# Patient Record
Sex: FEMALE | Race: WHITE | URBAN - METROPOLITAN AREA
[De-identification: names, ages, dates, MRNs, and addresses within clinical notes are randomized per-mention and may not be internally consistent; named-entity substitution may affect disease eponyms.]

---

## 2017-01-02 ENCOUNTER — TELEPHONE (OUTPATIENT)
Dept: FAMILY MEDICINE | Facility: CLINIC | Age: 27
End: 2017-01-02

## 2017-01-02 NOTE — TELEPHONE ENCOUNTER
Reason for Call:  Other     Detailed comments: dizzy, has been fighting a cold has questions    [phone]  Phone number    Best Time: any    Can we leave a detailed message on this number? YES    Call taken on 1/2/2017 at 1:46 PM by MAXIM [address]

## 2017-01-02 NOTE — TELEPHONE ENCOUNTER
Patient called reporting she has had a cold for 5-6 days. Feeling low energy has nasal congestion and feels dizzy. Other symptoms include nausea but denies vomiting. Had diarrhea over the weekend but resolved. The last couple days she has experienced dizziness. Taking OTC for cold Nyquil and sudafed. Advised patient try drinking power drinks instead of plain water, avoid sudden position changes. May try Flonase for nasal congestion use as directed to prevent rebond effect. Warning signs reviewed and advised she seek emergent care if worsening symptoms. Follow up with provider if no reilef with Hc treatment. UC information also given.

## 2017-01-03 ENCOUNTER — OFFICE VISIT (OUTPATIENT)
Dept: URGENT CARE | Facility: URGENT CARE | Age: 27
End: 2017-01-03
Payer: COMMERCIAL

## 2017-01-03 VITALS
SYSTOLIC BLOOD PRESSURE: 128 MMHG | HEIGHT: 70 IN | BODY MASS INDEX: 24.34 KG/M2 | WEIGHT: 170 LBS | DIASTOLIC BLOOD PRESSURE: 79 MMHG | HEART RATE: 71 BPM | TEMPERATURE: 98.3 F | OXYGEN SATURATION: 99 %

## 2017-01-03 DIAGNOSIS — H83.09 LABYRINTHITIS, UNSPECIFIED LATERALITY: Primary | ICD-10-CM

## 2017-01-03 DIAGNOSIS — J01.90 ACUTE SINUSITIS WITH SYMPTOMS GREATER THAN 10 DAYS: ICD-10-CM

## 2017-01-03 PROCEDURE — 99214 OFFICE O/P EST MOD 30 MIN: CPT | Performed by: EMERGENCY MEDICINE

## 2017-01-03 RX ORDER — METHYLPREDNISOLONE 4 MG
TABLET, DOSE PACK ORAL
Qty: 21 TABLET | Refills: 0 | Status: SHIPPED | OUTPATIENT
Start: 2017-01-03 | End: 2017-04-28

## 2017-01-03 RX ORDER — METHYLPREDNISOLONE 4 MG
TABLET, DOSE PACK ORAL
Qty: 21 TABLET | Refills: 0 | Status: SHIPPED | OUTPATIENT
Start: 2017-01-03 | End: 2017-01-03

## 2017-01-03 RX ORDER — AZITHROMYCIN 250 MG/1
250 TABLET, FILM COATED ORAL DAILY
Qty: 6 TABLET | Refills: 0 | Status: SHIPPED | OUTPATIENT
Start: 2017-01-03 | End: 2017-04-28

## 2017-01-03 NOTE — MR AVS SNAPSHOT
After Visit Summary   1/3/2017    Odalis Garcia    MRN: [id number]           Patient Information     Date Of Birth          1990        Visit Information        Provider Department      1/3/2017 8:00 PM Marc Bolden MD [facility]        Today's Diagnoses     Labyrinthitis, unspecified laterality    -  1     Acute sinusitis with symptoms greater than 10 days           Care Instructions      Inner Ear Problems: Causes of Dizziness (Vertigo)  Benign positional vertigo (BPV)    This is the most common cause of vertigo. BPV (also called benign positional paroxysmal vertigo or BPPV) results when crystals in the canals shift into the wrong position. Episodes usually occur when the head is moved in a certain way. This can happen when turning in bed, bending, or looking up.  BPV:    Causes episodes of vertigo that last for seconds. These episodes can occur several times a day, depending on body position.    Doesn t cause hearing loss.    Often goes away on its own, but may go away sooner with treatment.  Infection or inflammation    Sometimes the semicircular canals swell and send incorrect balance signals. This problem may be caused by a viral infection. Depending on the cause, hearing can be affected (labyrinthitis) or can remain normal (neuronitis).   Infection or inflammation:    Causes episodes of vertigo that last for hours or days. The first episode is usually the worst.    Can cause hearing loss.    Often goes away on its own, but may go away sooner with treatment.    May require vestibular rehabilitation if you have persistent imbalance.  Meniere s disease    Although uncommon, this condition happens when there is too much fluid in the canals. This causes increased pressure and swelling, and affects balance and hearing signals.  Meniere s disease may:    Cause episodes of vertigo that last for hours.    Cause fluctuating hearing problems, usually in one ear, that  "worsen over time.    Cause buzzing or ringing in the ears (tinnitus).    Cause a feeling of fullness or pressure in the ear.    Cause any of these symptoms: vertigo, hearing loss, tinnitus, or ear fullness to last a lifetime.    5767-6195 [employer]. [address] [place] [address]. All rights reserved. This information is not intended as a substitute for professional medical care. Always follow your healthcare professional's instructions.              Follow-ups after your visit        Who to contact     If you have questions or need follow up information about today's clinic visit or your schedule please contact [facility] directly at [phone].  Normal or non-critical lab and imaging results will be communicated to you by GoCoophart, letter or phone within 4 business days after the clinic has received the results. If you do not hear from us within 7 days, please contact the clinic through GoCoophart or phone. If you have a critical or abnormal lab result, we will notify you by phone as soon as possible.  Submit refill requests through Pose or call your pharmacy and they will forward the refill request to us. Please allow 3 business days for your refill to be completed.          Additional Information About Your Visit        GoCoopharKudarom Information     Pose lets you send messages to your doctor, view your test results, renew your prescriptions, schedule appointments and more. To sign up, go to [email or web address] . Click on \"Log in\" on the left side of the screen, which will take you to the Welcome page. Then click on \"Sign up Now\" on the right side of the page.     You will be asked to enter the access code listed below, as well as some personal information. Please follow the directions to create your username and password.     Your access code is: [email or web address]  Expires: 4/3/2017  9:22 PM     Your access code will  in 90 days. If you need help or a new " "code, please call your [facility] or [phone].        Care EveryWhere ID     This is your Care EveryWhere ID. This could be used by other organizations to access your [place] medical records  [id number]        Your Vitals Were     Pulse Temperature Height    71 98.3  F (36.8  C) (Tympanic) 5' 10\" (1.778 m)    BMI (Body Mass Index) Pulse Oximetry Last Period    24.39 kg/m2 99% 12/18/2016    Breastfeeding?          No         Blood Pressure from Last 3 Encounters:   01/03/17 128/79   07/29/16 120/76   02/29/16 110/62    Weight from Last 3 Encounters:   01/03/17 170 lb (77.111 kg)   07/29/16 168 lb (76.204 kg)   02/29/16 168 lb (76.204 kg)              Today, you had the following     No orders found for display         Today's Medication Changes          These changes are accurate as of: 1/3/17  9:22 PM.  If you have any questions, ask your nurse or doctor.               Start taking these medicines.        Dose/Directions    azithromycin 250 MG tablet   Commonly known as:  ZITHROMAX   Used for:  Acute sinusitis with symptoms greater than 10 days   Started by:  Marc Bolden MD        Dose:  250 mg   Take 1 tablet (250 mg) by mouth daily   Quantity:  6 tablet   Refills:  0       methylPREDNISolone 4 MG tablet   Commonly known as:  MEDROL DOSEPAK   Used for:  Labyrinthitis, unspecified laterality   Started by:  Marc Bolden MD        Follow package instructions   Quantity:  21 tablet   Refills:  0            Where to get your medicines      These medications were sent to [id number] - [place], [place] - [address] AT [facility] [address]  [facility] [address], [place] [address]    Hours:  24-hours Phone:  [phone]    - methylPREDNISolone 4 MG tablet      Some of these will need a paper prescription and others can be bought over the counter.  Ask your nurse if you have questions.     Bring a paper prescription for each of these medications    - azithromycin 250 MG " tablet             Primary Care Provider Office Phone # Fax #    Brigid Clare Guajardo [phone]       [address] [address]        Thank you!     Thank you for choosing [facility]  for your care. Our goal is always to provide you with excellent care. Hearing back from our patients is one way we can continue to improve our services. Please take a few minutes to complete the written survey that you may receive in the mail after your visit with us. Thank you!             Your Updated Medication List - Protect others around you: Learn how to safely use, store and throw away your medicines at www.disposemymeds.org.          This list is accurate as of: 1/3/17  9:22 PM.  Always use your most recent med list.                   Brand Name Dispense Instructions for use    azithromycin 250 MG tablet    ZITHROMAX    6 tablet    Take 1 tablet (250 mg) by mouth daily       fluticasone 50 MCG/ACT spray    FLONASE    1 Bottle    Spray 1-2 sprays into both nostrils daily       methylPREDNISolone 4 MG tablet    MEDROL DOSEPAK    21 tablet    Follow package instructions       * RITALIN 20 MG tablet   Generic drug:  methylphenidate      1 TABLET TWICE DAILY BEFORE MEALS       * methylphenidate 20 MG tablet    RITALIN    90 tablet    Take 1 tablet (20 mg) by mouth 3 times daily as needed       * methylphenidate 20 MG tablet   Start taking on:  1/25/2017    RITALIN    90 tablet    Take 1 tablet (20 mg) by mouth 3 times daily as needed       * methylphenidate 20 MG tablet   Start taking on:  2/22/2017    RITALIN    90 tablet    Take 1 tablet (20 mg) by mouth 3 times daily as needed       * Notice:  This list has 4 medication(s) that are the same as other medications prescribed for you. Read the directions carefully, and ask your doctor or other care provider to review them with you.

## 2017-01-04 ASSESSMENT — ENCOUNTER SYMPTOMS
WEAKNESS: 0
FATIGUE: 1
ABDOMINAL PAIN: 0
ANOREXIA: 0
SORE THROAT: 0
VISUAL CHANGE: 0
CHANGE IN BOWEL HABIT: 0
DIZZINESS: 1
NAUSEA: 0
DIAPHORESIS: 0
COUGH: 0
VOMITING: 0
FEVER: 0
VERTIGO: 0
HEADACHES: 0
NUMBNESS: 0
CHILLS: 0

## 2017-01-04 NOTE — PROGRESS NOTES
Dizziness  This is a new problem. The current episode started yesterday. The problem occurs 2 to 4 times per day. The problem has been gradually improving. Associated symptoms include congestion and fatigue. Pertinent negatives include no abdominal pain, anorexia, change in bowel habit, chest pain, chills, coughing, diaphoresis, fever, headaches, nausea, numbness, sore throat, vertigo, visual change, vomiting or weakness. The symptoms are aggravated by walking. She has tried NSAIDs, immobilization and rest (dramamine and sudafed) for the symptoms. The treatment provided mild relief.       No past medical history on file.    No past surgical history on file.    Social History     Social History     Marital Status: Single     Spouse Name: N/A     Number of Children: N/A     Years of Education: N/A     Occupational History     Not on file.     Social History Main Topics     Smoking status: Never Smoker      Smokeless tobacco: Never Used     Alcohol Use: 0.0 oz/week     0 Standard drinks or equivalent per week     Drug Use: No     Sexual Activity:     Partners: Male     Birth Control/ Protection: IUD     Other Topics Concern     Parent/Sibling W/ Cabg, Mi Or Angioplasty Before 65f 55m? No     Social History Narrative       Family History   Problem Relation Age of Onset     Cirrhosis Mother      Depression Mother      Migraines Mother      MENTAL ILLNESS Mother      Hyperlipidemia Father      Hypertension Father      Prostate Cancer Father      Hyperlipidemia Father      OSTEOPOROSIS Maternal Grandmother          Review of Systems   Constitutional: Positive for fatigue. Negative for fever, chills and diaphoresis.   HENT: Positive for congestion. Negative for sore throat.    Respiratory: Negative for cough.    Cardiovascular: Negative for chest pain.   Gastrointestinal: Negative for nausea, vomiting, abdominal pain, anorexia and change in bowel habit.   Neurological: Positive for dizziness. Negative for vertigo, weakness,  "numbness and headaches.         Physical Exam   Constitutional: She is oriented to person, place, and time and well-developed, well-nourished, and in no distress. No distress.   HENT:   Head: Normocephalic and atraumatic.   Right Ear: Tympanic membrane, external ear and ear canal normal.   Left Ear: Tympanic membrane, external ear and ear canal normal.   Mouth/Throat: Uvula is midline and oropharynx is clear and moist. No trismus in the jaw. No uvula swelling. No oropharyngeal exudate, posterior oropharyngeal edema, posterior oropharyngeal erythema or tonsillar abscesses.   Eyes: Conjunctivae and EOM are normal. Pupils are equal, round, and reactive to light.   Neck: Normal range of motion.   Pulmonary/Chest: Effort normal.   Musculoskeletal: Normal range of motion.   Neurological: She is alert and oriented to person, place, and time. She is not agitated and not disoriented. She displays no weakness, no tremor, facial symmetry, normal stance and normal speech. No cranial nerve deficit. She exhibits normal muscle tone. Coordination and gait normal.   Skin: Skin is warm and dry. She is not diaphoretic.   Psychiatric: Affect and judgment normal.   Nursing note and vitals reviewed.    /79 mmHg  Pulse 71  Temp(Src) 98.3  F (36.8  C) (Tympanic)  Ht 5' 10\" (1.778 m)  Wt 170 lb (77.111 kg)  BMI 24.39 kg/m2  SpO2 99%  LMP 12/18/2016  Breastfeeding? No      A/P:  Odalis was seen today for urgent care and dizziness.    Diagnoses and all orders for this visit:    Labyrinthitis, unspecified laterality  -     Discontinue: methylPREDNISolone (MEDROL DOSEPAK) 4 MG tablet; Follow package instructions  -     methylPREDNISolone (MEDROL DOSEPAK) 4 MG tablet; Follow package instructions    Acute sinusitis with symptoms greater than 10 days  -     azithromycin (ZITHROMAX) 250 MG tablet; Take 1 tablet (250 mg) by mouth daily      Questionable cause to her symptoms.  Seems reasonable to conclude that they are related to her " recent head cold.  She has tried sudafed and dramamine and reports that her symptoms are better today.  It is possible that this is related to labyrinthitis or even a BPPV, but I did not see any horizontal nystagmus after performing quick side to side head movements.  Given the option to try medrol to see if that may help with her symptoms.  Given a course of abx to use in case she starts to get more of a sensation similar to her past sinus infections.  If symptoms fail to get better or are getting worse recommend f/u with primary care of the UC/ER.  She agreed to plans.    Marc Bolden MD

## 2017-01-04 NOTE — PATIENT INSTRUCTIONS
Inner Ear Problems: Causes of Dizziness (Vertigo)  Benign positional vertigo (BPV)    This is the most common cause of vertigo. BPV (also called benign positional paroxysmal vertigo or BPPV) results when crystals in the canals shift into the wrong position. Episodes usually occur when the head is moved in a certain way. This can happen when turning in bed, bending, or looking up.  BPV:    Causes episodes of vertigo that last for seconds. These episodes can occur several times a day, depending on body position.    Doesn t cause hearing loss.    Often goes away on its own, but may go away sooner with treatment.  Infection or inflammation    Sometimes the semicircular canals swell and send incorrect balance signals. This problem may be caused by a viral infection. Depending on the cause, hearing can be affected (labyrinthitis) or can remain normal (neuronitis).   Infection or inflammation:    Causes episodes of vertigo that last for hours or days. The first episode is usually the worst.    Can cause hearing loss.    Often goes away on its own, but may go away sooner with treatment.    May require vestibular rehabilitation if you have persistent imbalance.  Meniere s disease    Although uncommon, this condition happens when there is too much fluid in the canals. This causes increased pressure and swelling, and affects balance and hearing signals.  Meniere s disease may:    Cause episodes of vertigo that last for hours.    Cause fluctuating hearing problems, usually in one ear, that worsen over time.    Cause buzzing or ringing in the ears (tinnitus).    Cause a feeling of fullness or pressure in the ear.    Cause any of these symptoms: vertigo, hearing loss, tinnitus, or ear fullness to last a lifetime.    6692-9460 [employer]. [address] [place] [address]. All rights reserved. This information is not intended as a substitute for professional medical care. Always follow your healthcare  professional's instructions.

## 2017-01-04 NOTE — NURSING NOTE
"Chief Complaint   Patient presents with     Urgent Care     Dizziness     c/o dizziness for 2 days       Initial /79 mmHg  Pulse 71  Temp(Src) 98.3  F (36.8  C) (Tympanic)  Ht 5' 10\" (1.778 m)  Wt 170 lb (77.111 kg)  BMI 24.39 kg/m2  SpO2 99%  LMP 12/18/2016  Breastfeeding? No Estimated body mass index is 24.39 kg/(m^2) as calculated from the following:    Height as of this encounter: 5' 10\" (1.778 m).    Weight as of this encounter: 170 lb (77.111 kg).  BP completed using cuff size: regular  Rosita Vicente MA    "

## 2017-04-28 ENCOUNTER — TELEPHONE (OUTPATIENT)
Dept: NURSING | Facility: CLINIC | Age: 27
End: 2017-04-28

## 2017-04-28 ENCOUNTER — OFFICE VISIT (OUTPATIENT)
Dept: FAMILY MEDICINE | Facility: CLINIC | Age: 27
End: 2017-04-28
Payer: COMMERCIAL

## 2017-04-28 VITALS
DIASTOLIC BLOOD PRESSURE: 68 MMHG | SYSTOLIC BLOOD PRESSURE: 112 MMHG | WEIGHT: 170 LBS | BODY MASS INDEX: 24.34 KG/M2 | HEIGHT: 70 IN | TEMPERATURE: 97.9 F | RESPIRATION RATE: 16 BRPM | OXYGEN SATURATION: 99 % | HEART RATE: 78 BPM

## 2017-04-28 DIAGNOSIS — R82.90 ABNORMAL URINE FINDINGS: ICD-10-CM

## 2017-04-28 DIAGNOSIS — R30.0 DYSURIA: Primary | ICD-10-CM

## 2017-04-28 LAB
ALBUMIN UR-MCNC: 30 MG/DL
APPEARANCE UR: ABNORMAL
BILIRUB UR QL STRIP: NEGATIVE
COLOR UR AUTO: YELLOW
GLUCOSE UR STRIP-MCNC: NEGATIVE MG/DL
HGB UR QL STRIP: ABNORMAL
KETONES UR STRIP-MCNC: NEGATIVE MG/DL
LEUKOCYTE ESTERASE UR QL STRIP: ABNORMAL
NITRATE UR QL: NEGATIVE
PH UR STRIP: 5.5 PH (ref 5–7)
RBC #/AREA URNS AUTO: ABNORMAL /HPF (ref 0–2)
SP GR UR STRIP: 1.01 (ref 1–1.03)
URN SPEC COLLECT METH UR: ABNORMAL
UROBILINOGEN UR STRIP-ACNC: 0.2 EU/DL (ref 0.2–1)
WBC #/AREA URNS AUTO: ABNORMAL /HPF (ref 0–2)

## 2017-04-28 PROCEDURE — 99213 OFFICE O/P EST LOW 20 MIN: CPT | Performed by: PHYSICIAN ASSISTANT

## 2017-04-28 PROCEDURE — 87186 SC STD MICRODIL/AGAR DIL: CPT | Performed by: PHYSICIAN ASSISTANT

## 2017-04-28 PROCEDURE — 81001 URINALYSIS AUTO W/SCOPE: CPT | Performed by: PHYSICIAN ASSISTANT

## 2017-04-28 PROCEDURE — 87086 URINE CULTURE/COLONY COUNT: CPT | Performed by: PHYSICIAN ASSISTANT

## 2017-04-28 PROCEDURE — 87088 URINE BACTERIA CULTURE: CPT | Performed by: PHYSICIAN ASSISTANT

## 2017-04-28 RX ORDER — NITROFURANTOIN 25; 75 MG/1; MG/1
100 CAPSULE ORAL 2 TIMES DAILY
Qty: 10 CAPSULE | Refills: 0 | Status: SHIPPED | OUTPATIENT
Start: 2017-04-28 | End: 2017-05-03

## 2017-04-28 NOTE — PROGRESS NOTES
"  SUBJECTIVE:                                                    Odalis Garcia is a 27 year old female who presents to clinic today for the following health issues:      URINARY TRACT SYMPTOMS      Duration: 2 days    Description  dysuria, frequency, urgency and pink tint    Intensity:  moderate    Accompanying signs and symptoms:  Fever/chills: no   Flank pain no   Nausea and vomiting: no   Vaginal symptoms: none  Abdominal/Pelvic Pain: no     History  History of frequent UTI's: no   History of kidney stones: no   Sexually Active: YES  Possibility of pregnancy: No    Precipitating or alleviating factors: None    Therapies tried and outcome: none   Outcome:        HPI additional notes:   Chief Complaint   Patient presents with     UTI     Odalis presents today with UTI since yesterday. Reports burning with urination, hematuria, frequency, urgency. Denies f/c/s, pelvic or flank pain, vaginal discharge or irritation.     ROS:  Skin: negative  Eyes: negative  Ears/Nose/Throat: negative  Respiratory: No shortness of breath, dyspnea on exertion, cough, or hemoptysis  Cardiovascular: negative  Gastrointestinal: negative  Genitourinary: as above  Musculoskeletal: negative  Neurologic: negative  Psychiatric: negative  Hematologic/Lymphatic/Immunologic: negative  Endocrine: negative    Chart Review:  History   Smoking Status     Never Smoker   Smokeless Tobacco     Never Used       Patient Active Problem List   Diagnosis     Influenza A     ADD (attention deficit disorder)     History reviewed. No pertinent surgical history.  Problem list, Medication list, Allergies, Medical/Social/Surg hx reviewed in [facility], updated as appropriate.   OBJECTIVE:                                                    /68  Pulse 78  Temp 97.9  F (36.6  C) (Tympanic)  Resp 16  Ht 5' 10\" (1.778 m)  Wt 170 lb (77.1 kg)  SpO2 99%  BMI 24.39 kg/m2  Body mass index is 24.39 kg/(m^2).  GENERAL: healthy, in no acute distress  EYES: no discharge, " no injection  HENT: Mucous mebranes moist, normocephalic  MS: no gross deformities noted. Moves all 4 limbs spontaneously  SKIN: no suspicious lesions, no rashes  PSYCH: Able to articulate logical thoughts. Affect is normal.  NEURO: alert, sensation grossly intact    Diagnostic test results:   Results for orders placed or performed in visit on 04/28/17 (from the past 24 hour(s))   UA reflex to Microscopic and Culture   Result Value Ref Range    Color Urine Yellow     Appearance Urine Slightly Cloudy     Glucose Urine Negative NEG mg/dL    Bilirubin Urine Negative NEG    Ketones Urine Negative NEG mg/dL    Specific Gravity Urine 1.015 1.003 - 1.035    Blood Urine Large (A) NEG    pH Urine 5.5 5.0 - 7.0 pH    Protein Albumin Urine 30 (A) NEG mg/dL    Urobilinogen Urine 0.2 0.2 - 1.0 EU/dL    Nitrite Urine Negative NEG    Leukocyte Esterase Urine Moderate (A) NEG    Source Midstream Urine    Urine Microscopic   Result Value Ref Range    WBC Urine 10-25 (A) 0 - 2 /HPF    RBC Urine 25-50 (A) 0 - 2 /HPF        ASSESSMENT/PLAN:                                                          ICD-10-CM    1. Dysuria R30.0 UA reflex to Microscopic and Culture     Urine Microscopic     nitrofurantoin, macrocrystal-monohydrate, (MACROBID) 100 MG capsule     Take entire course of antibx as prescribed, even if sx improve. Will inform you of UC results when they return. Maintain adequate hydration.    Please see patient instructions for treatment details.    Follow up in 7-10 days if not improving as anticipated, sooner PRN.    Joie Barron PA-C  [facility]

## 2017-04-28 NOTE — LETTER
[address]  [address]  [facility] [address]  [phone]                                                                                                           Odalis Garcia  [address] [place] [address]    May 2, 2017      Dear Odalis,    The results of your recent tests were reviewed and are enclosed.     -Urine culture is abnormal and grew out bacteria that are sensitive to the antibiotic you have been given.  Complete the medication as prescribed and if you experience new, worsening or persistent symptoms, you should call or return for a recheck.    Urine Microscopic   Urine Culture Aerobic Bacterial   Result Value Ref Range    Specimen Description Midstream Urine     Culture Micro >100,000 colonies/mL Escherichia coli (A)     Micro Report Status FINAL 05/01/2017     Organism: >100,000 colonies/mL Escherichia coli        Thank you for choosing [facility].  We appreciate the opportunity to serve you and look forward to supporting your healthcare needs in the future.    If you have any questions or concerns, please call me or my staff at [phone].      Sincerely,        Joie Barron PA-C

## 2017-04-28 NOTE — NURSING NOTE
"Chief Complaint   Patient presents with     UTI       Initial /68  Pulse 78  Temp 97.9  F (36.6  C) (Tympanic)  Resp 16  Ht 5' 10\" (1.778 m)  Wt 170 lb (77.1 kg)  SpO2 99%  BMI 24.39 kg/m2 Estimated body mass index is 24.39 kg/(m^2) as calculated from the following:    Height as of this encounter: 5' 10\" (1.778 m).    Weight as of this encounter: 170 lb (77.1 kg).  Medication Reconciliation: complete   .Antonio LUNA      "

## 2017-04-28 NOTE — TELEPHONE ENCOUNTER
Call Type: Triage Call    Presenting Problem: Odalis states she thinks she has a UTI. Frequency  and urgeny to void, dysuria. Denies fever or low back pain. Started  about last night. Suggested Zipnosis.  Triage Note:  Guideline Title: Urinary Symptoms - Female  Recommended Disposition: See Provider within 24 hours  Original Inclination: Wanted to speak with a nurse  Override Disposition:  Intended Action: Follow advice given  Physician Contacted: No  Has one or more urinary tract symptoms AND has not been previously evaluated ?  YES  Blood in urine ? NO  Abnormal vaginal discharge (such as increased quantity, abnormal color,  consistency, odor) ? NO  Flank pain ? NO  New or worsening signs and symptoms that may indicate shock ? NO  Any temperature elevation in a frail elderly, immunocompromised or pregnant person  ? NO  Unbearable abdominal/pelvic pain ? NO  Current or recent urinary tract instrumentation AND urinary tract symptoms OR no  urine flow ? NO  Urinary tract symptoms AND any flank or low back pain ? NO  Urinary tract symptoms AND fever 101.5 F (38.6 C) or higher or vomiting ? NO  No urination for 12 or more hours ? NO  Any other unexpected urinary symptoms following urinary tract or abdominal surgery  within timeframe specified by provider ? NO  Physician Instructions:  Care Advice: Call provider if you develop flank or low back pain, fever,  generally feel sick.  Call provider if urine is pink, red, smoky or cola colored.

## 2017-04-28 NOTE — MR AVS SNAPSHOT
"              After Visit Summary   4/28/2017    Odalis Garcia    MRN: [id number]           Patient Information     Date Of Birth          1990        Visit Information        Provider Department      4/28/2017 7:30 AM Joie Barron PA-C [facility]        Today's Diagnoses     Dysuria    -  1       Follow-ups after your visit        Your next 10 appointments already scheduled     May 02, 2017  2:00 PM CDT   Office Visit with Brigid Guajardo MD   [facility]    [address]  [facility] [address] [phone]           Bring a current list of meds and any records pertaining to this visit.  For Physicals, please bring immunization records and any forms needing to be filled out.  Please arrive 10 minutes early to complete paperwork.              Who to contact     If you have questions or need follow up information about today's clinic visit or your schedule please contact [facility] directly at [phone].  Normal or non-critical lab and imaging results will be communicated to you by NuFlickhart, letter or phone within 4 business days after the clinic has received the results. If you do not hear from us within 7 days, please contact the clinic through Cool Lumenst or phone. If you have a critical or abnormal lab result, we will notify you by phone as soon as possible.  Submit refill requests through Texere or call your pharmacy and they will forward the refill request to us. Please allow 3 business days for your refill to be completed.          Additional Information About Your Visit        NuFlickhart Information     Texere lets you send messages to your doctor, view your test results, renew your prescriptions, schedule appointments and more. To sign up, go to [email or web address] . Click on \"Log in\" on the left side of the screen, which " "will take you to the Welcome page. Then click on \"Sign up Now\" on the right side of the page.     You will be asked to enter the access code listed below, as well as some personal information. Please follow the directions to create your username and password.     Your access code is: [email or web address]  Expires: 2017  8:17 AM     Your access code will  in 90 days. If you need help or a new code, please call your [facility] or [phone].        Care EveryWhere ID     This is your Care EveryWhere ID. This could be used by other organizations to access your [facility] medical records  [id number]        Your Vitals Were     Pulse Temperature Respirations Height Pulse Oximetry BMI (Body Mass Index)    78 97.9  F (36.6  C) (Tympanic) 16 5' 10\" (1.778 m) 99% 24.39 kg/m2       Blood Pressure from Last 3 Encounters:   17 112/68   17 128/79   16 120/76    Weight from Last 3 Encounters:   17 170 lb (77.1 kg)   17 170 lb (77.1 kg)   16 168 lb (76.2 kg)              We Performed the Following     UA reflex to Microscopic and Culture     Urine Microscopic          Today's Medication Changes          These changes are accurate as of: 17  8:17 AM.  If you have any questions, ask your nurse or doctor.               Start taking these medicines.        Dose/Directions    nitrofurantoin (macrocrystal-monohydrate) 100 MG capsule   Commonly known as:  MACROBID   Used for:  Dysuria   Started by:  Joie Barron PA-C        Dose:  100 mg   Take 1 capsule (100 mg) by mouth 2 times daily for 5 days   Quantity:  10 capsule   Refills:  0            Where to get your medicines      These medications were sent to [facility] [id number] [place] [address] AT [address] [address]    Hours:  24-hours Phone:  [phone]     nitrofurantoin (macrocrystal-monohydrate) 100 MG capsule                Primary Care " Provider Office Phone # Fax #    Brigid Clare Guajardo [phone]       [address] [address]        Thank you!     Thank you for choosing [facility]  for your care. Our goal is always to provide you with excellent care. Hearing back from our patients is one way we can continue to improve our services. Please take a few minutes to complete the written survey that you may receive in the mail after your visit with us. Thank you!             Your Updated Medication List - Protect others around you: Learn how to safely use, store and throw away your medicines at www.disposemymeds.org.          This list is accurate as of: 4/28/17  8:17 AM.  Always use your most recent med list.                   Brand Name Dispense Instructions for use    fluticasone 50 MCG/ACT spray    FLONASE    1 Bottle    Spray 1-2 sprays into both nostrils daily       methylphenidate 20 MG tablet    RITALIN    90 tablet    Take 1 tablet (20 mg) by mouth 3 times daily as needed       nitrofurantoin (macrocrystal-monohydrate) 100 MG capsule    MACROBID    10 capsule    Take 1 capsule (100 mg) by mouth 2 times daily for 5 days

## 2017-05-01 LAB
BACTERIA SPEC CULT: ABNORMAL
MICRO REPORT STATUS: ABNORMAL
MICROORGANISM SPEC CULT: ABNORMAL
SPECIMEN SOURCE: ABNORMAL

## 2017-05-02 NOTE — PROGRESS NOTES
Lab letter signed and printed. Message comments below:  -Urine culture is abnormal and grew out bacteria that are sensitive to the antibiotic you have been given.  Complete the medication as prescribed and if you experience new, worsening or persistent symptoms, you should call or return for a recheck.

## 2017-05-30 ENCOUNTER — OFFICE VISIT (OUTPATIENT)
Dept: FAMILY MEDICINE | Facility: CLINIC | Age: 27
End: 2017-05-30
Payer: COMMERCIAL

## 2017-05-30 VITALS
DIASTOLIC BLOOD PRESSURE: 64 MMHG | RESPIRATION RATE: 18 BRPM | BODY MASS INDEX: 24.05 KG/M2 | SYSTOLIC BLOOD PRESSURE: 112 MMHG | OXYGEN SATURATION: 99 % | TEMPERATURE: 97.8 F | HEART RATE: 90 BPM | WEIGHT: 168 LBS | HEIGHT: 70 IN

## 2017-05-30 DIAGNOSIS — F98.8 ADD (ATTENTION DEFICIT DISORDER): ICD-10-CM

## 2017-05-30 DIAGNOSIS — F43.22 ADJUSTMENT DISORDER WITH ANXIOUS MOOD: Primary | ICD-10-CM

## 2017-05-30 PROBLEM — J30.2 SEASONAL ALLERGIC RHINITIS: Status: ACTIVE | Noted: 2017-05-30

## 2017-05-30 PROCEDURE — 99214 OFFICE O/P EST MOD 30 MIN: CPT | Performed by: FAMILY MEDICINE

## 2017-05-30 RX ORDER — METHYLPHENIDATE HYDROCHLORIDE 20 MG/1
20 TABLET ORAL 3 TIMES DAILY PRN
Qty: 90 TABLET | Refills: 0 | Status: SHIPPED | OUTPATIENT
Start: 2017-06-06 | End: 2017-08-07

## 2017-05-30 NOTE — PROGRESS NOTES
"  SUBJECTIVE:                                                    Odalis Garcia is a 27 year old female who presents to clinic today for the following health issues:    Medication Followup of Ritlan    Taking Medication as prescribed: yes    Side Effects:  No    Medication Helping Symptoms:  yes     27 year old very pleasant woman with ADD and seasonal allergies, here today for ADD check up but actually going quite well.  No concerns, takes ritalin BID most days, occasionally TID.  Sleeping well, work going well.  Has had since childhood and records are on file through [facility].      Also really struggling with issue with boyfriend of 6 years.  He's going to grad school in the fall and she's planning to stay here - really worried about what that will do for their relationship.  Tearful, stressed about it.  Too much uncertainty.  Wonders what she can do?  Hasn't seen a therapist recently but feels it might be helpful.    Problem list and histories reviewed & adjusted, as indicated.  Additional history: as documented    BP Readings from Last 3 Encounters:   05/30/17 112/64   04/28/17 112/68   01/03/17 128/79    Wt Readings from Last 3 Encounters:   05/30/17 168 lb (76.2 kg)   04/28/17 170 lb (77.1 kg)   01/03/17 170 lb (77.1 kg)                    Reviewed and updated as needed this visit by clinical staff  Tobacco  Allergies  Meds  Problems  Med Hx  Surg Hx  Fam Hx  Soc Hx        Reviewed and updated as needed this visit by Provider         ROS:  Constitutional, HEENT, cardiovascular, pulmonary, gi and gu systems are negative, except as otherwise noted.    OBJECTIVE:                                                    /64  Pulse 90  Temp 97.8  F (36.6  C) (Oral)  Resp 18  Ht 5' 10\" (1.778 m)  Wt 168 lb (76.2 kg)  LMP  (LMP Unknown)  SpO2 99%  Breastfeeding? No  BMI 24.11 kg/m2  Body mass index is 24.11 kg/(m^2).  GENERAL: healthy, alert and no distress  NECK: no adenopathy, no asymmetry, masses, or " scars and thyroid normal to palpation  RESP: lungs clear to auscultation - no rales, rhonchi or wheezes  CV: regular rate and rhythm, normal S1 S2, no S3 or S4, no murmur, click or rub, no peripheral edema and peripheral pulses strong  ABDOMEN: soft, nontender, no hepatosplenomegaly, no masses and bowel sounds normal  MS: no gross musculoskeletal defects noted, no edema  PSYCH: mentation appears normal, tangential, affect normal/bright, anxious, judgement and insight intact and appearance well groomed     ASSESSMENT/PLAN:                                                      Odalis was seen today for recheck medication.    Diagnoses and all orders for this visit:    Adjustment disorder with anxious mood   --Discussed therapy; I think this would be a great step for her right now as she and her boyfriend are trying to figure out how to move forward.  Gave her some resources and referral; follow up if worsens or doesn't improve.  -     MENTAL HEALTH REFERRAL  -     MENTAL HEALTH REFERRAL    ADD (attention deficit disorder)   --Stable, seems to use less than prescribed (prescriptions last longer than expected).  Refilled today for next 30-60 days.  -     methylphenidate (RITALIN) 20 MG tablet; Take 1 tablet (20 mg) by mouth 3 times daily as needed    Greater than 25 minutes total were spent on this encounter, of which more than 50% was spent in direct communication with the patient including history, exam, counseling and coordination of care.       Brigid Guajardo MD  [facility]    Working on nonfixating

## 2017-05-30 NOTE — MR AVS SNAPSHOT
After Visit Summary   5/30/2017    Odalis Garcia    MRN: [id number]           Patient Information     Date Of Birth          1990        Visit Information        Provider Department      5/30/2017 2:40 PM Brigid Guajardo MD [facility]        Today's Diagnoses     Adjustment disorder with anxious mood    -  1       Follow-ups after your visit        Additional Services     MENTAL HEALTH REFERRAL       Your provider has referred you to: [facility]: [facility] - Counseling (Individual/Couples/Family) - Tracie Saldaan, or other who does CBT    All scheduling is subject to the client's specific insurance plan & benefits, provider/location availability, and provider clinical specialities.  Please arrive 15 minutes early for your first appointment and bring your completed paperwork.    Please be aware that coverage of these services is subject to the terms and limitations of your health insurance plan.  Call member services at your health plan with any benefit or coverage questions.                  Who to contact     If you have questions or need follow up information about today's clinic visit or your schedule please contact [facility] directly at [phone].  Normal or non-critical lab and imaging results will be communicated to you by MyChart, letter or phone within 4 business days after the clinic has received the results. If you do not hear from us within 7 days, please contact the clinic through MyChart or phone. If you have a critical or abnormal lab result, we will notify you by phone as soon as possible.  Submit refill requests through [employer] or call your pharmacy and they will forward the refill request to us. Please allow 3 business days for your refill to be completed.          Additional Information About Your Visit        Autobook Nowhart Information     [employer] lets you send messages to your doctor, view your  "test results, renew your prescriptions, schedule appointments and more. To sign up, go to [email or web address] . Click on \"Log in\" on the left side of the screen, which will take you to the Welcome page. Then click on \"Sign up Now\" on the right side of the page.     You will be asked to enter the access code listed below, as well as some personal information. Please follow the directions to create your username and password.     Your access code is: [email or web address]  Expires: 2017  8:17 AM     Your access code will  in 90 days. If you need help or a new code, please call your [facility] clinic or [phone].        Care EveryWhere ID     This is your [facility] EveryWhere ID. This could be used by other organizations to access your [facility] medical records  [id number]        Your Vitals Were     Pulse Temperature Respirations Height Last Period Pulse Oximetry    90 97.8  F (36.6  C) (Oral) 18 5' 10\" (1.778 m) (LMP Unknown) 99%    Breastfeeding? BMI (Body Mass Index)                No 24.11 kg/m2           Blood Pressure from Last 3 Encounters:   17 132/88   17 112/68   17 128/79    Weight from Last 3 Encounters:   17 168 lb (76.2 kg)   17 170 lb (77.1 kg)   17 170 lb (77.1 kg)              We Performed the Following     MENTAL HEALTH REFERRAL        Primary Care Provider Office Phone # Fax #    Brigid Guajardo [phone]       [address] [address]        Thank you!     Thank you for choosing [facility]  for your care. Our goal is always to provide you with excellent care. Hearing back from our patients is one way we can continue to improve our services. Please take a few minutes to complete the written survey that you may receive in the mail after your visit with us. Thank you!             Your Updated Medication List - Protect others around you: Learn how to safely use, store " and throw away your medicines at www.disposemymeds.org.          This list is accurate as of: 5/30/17  3:31 PM.  Always use your most recent med list.                   Brand Name Dispense Instructions for use    fluticasone 50 MCG/ACT spray    FLONASE    1 Bottle    Spray 1-2 sprays into both nostrils daily       methylphenidate 20 MG tablet    RITALIN    90 tablet    Take 1 tablet (20 mg) by mouth 3 times daily as needed

## 2017-06-10 PROBLEM — F43.22 ADJUSTMENT DISORDER WITH ANXIOUS MOOD: Status: ACTIVE | Noted: 2017-06-10

## 2017-08-07 DIAGNOSIS — F98.8 ATTENTION DEFICIT DISORDER (ADD) WITHOUT HYPERACTIVITY: Primary | ICD-10-CM

## 2017-08-07 NOTE — TELEPHONE ENCOUNTER
Reason for Call:  Medication or medication refill:  Do you use a [facility]?  Name of the pharmacy and phone number for the current request:  [facility] [id number] [place], [place] [address] AVE AT [address]  Name of the medication requested: methylphenidate (RITALIN) 20 MG tablet  Other request: none  Can we leave a detailed message on this number? YES  Phone number patient can be reached at: Home number on file [phone] (home)  Best Time: any  Call taken on 8/7/2017 at 7:24 AM by JULIANNE CARNES

## 2017-08-08 NOTE — TELEPHONE ENCOUNTER
ritalin      Last Written Prescription Date:  6/6/17  Last Fill Quantity: 90,   # refills: 0  Last Office Visit with [facility] or  Health prescribing provider: 5/30/17  Future Office visit:       Routing refill request to provider for review/approval because:  Drug not on the [facility], [facility] or  [facility] refill protocol or controlled substance

## 2017-08-09 RX ORDER — METHYLPHENIDATE HYDROCHLORIDE 20 MG/1
20 TABLET ORAL 3 TIMES DAILY PRN
Qty: 90 TABLET | Refills: 0 | Status: SHIPPED | OUTPATIENT
Start: 2017-08-09 | End: 2017-10-04

## 2017-10-04 DIAGNOSIS — F98.8 ATTENTION DEFICIT DISORDER (ADD) WITHOUT HYPERACTIVITY: ICD-10-CM

## 2017-10-04 RX ORDER — METHYLPHENIDATE HYDROCHLORIDE 20 MG/1
20 TABLET ORAL 3 TIMES DAILY PRN
Qty: 90 TABLET | Refills: 0 | Status: SHIPPED | OUTPATIENT
Start: 2017-10-04 | End: 2017-12-06

## 2017-10-04 NOTE — TELEPHONE ENCOUNTER
Reason for Call:  Medication or medication refill:    Do you use a [facility]?  Name of the pharmacy and phone number for the current request:   [facility]    Name of the medication requested: Ritalin    Other request: none    Can we leave a detailed message on this number? YES    Phone number patient can be reached at: Cell number on file:    Telephone Information:   Mobile [phone]       Best Time: any    Call taken on 10/4/2017 at 1:00 PM by WOOD JANG

## 2017-10-04 NOTE — TELEPHONE ENCOUNTER
methylphenidate (RITALIN) 20 MG tablet      Last Written Prescription Date:  8/9/17  Last Fill Quantity: 90,   # refills: 0  Last Office Visit with [facility], [facility] or [facility] prescribing provider: 5/30/17  Future Office visit:       Routing refill request to provider for review/approval because:  Drug not on the [facility], [facility] or [facility] refill protocol or controlled substance

## 2017-10-04 NOTE — TELEPHONE ENCOUNTER
Controlled Substance Refill Request for methylphenidate (RITALIN) 20 MG tablet      Problem List Complete:  Yes  Patient is followed by JET DOZIER for ongoing prescription of stimulants.  All refills should be approved by this provider, or covering partner.    Medication(s): Ritalin.   Maximum quantity per month: #90  Clinic visit frequency required: Q 6  months     Controlled substance agreement on file: Yes       Date(s): 7/29/16  Neuropsych evaluation for ADD completed:  Yes, completed at age 5 and we have records from [facility], not on file    Last [facility] website verification:  done on 7/29/2016   [email or web address]         checked in past 6 months?  Yes 07/29/2016

## 2017-11-30 DIAGNOSIS — F98.8 ATTENTION DEFICIT DISORDER (ADD) WITHOUT HYPERACTIVITY: ICD-10-CM

## 2017-11-30 RX ORDER — METHYLPHENIDATE HYDROCHLORIDE 20 MG/1
20 TABLET ORAL 3 TIMES DAILY PRN
Qty: 90 TABLET | Refills: 0 | Status: CANCELLED | OUTPATIENT
Start: 2017-11-30

## 2017-11-30 NOTE — TELEPHONE ENCOUNTER
Controlled Substance Refill Request for methylphenidate (RITALIN) 20 MG tablet  Problem List Complete:  Yes  Patient is followed by JET DOZIER for ongoing prescription of stimulants.  All refills should be approved by this provider, or covering partner.    Medication(s): Ritalin.   Maximum quantity per month: #90  Clinic visit frequency required: Q 6  months     Controlled substance agreement on file: Yes       Date(s): 7/29/16  Neuropsych evaluation for ADD completed:  Yes, completed at age 5 and we have records from [facility], not on file    Last [facility] website verification:  done on 7/29/2016   [email or web address]       checked in past 6 months?  Yes 11/30/2017 - no concerns

## 2017-11-30 NOTE — TELEPHONE ENCOUNTER
Reason for Call:  Medication or medication refill:    Do you use a [facility]?  Name of the pharmacy and phone number for the current request:   [facility] office    Name of the medication requested: Methylphenidate    Other request: Pt does authorize her mom Brigid Garcia to  script    Can we leave a detailed message on this number? YES    Phone number patient can be reached at: Cell number on file:    Telephone Information:   Mobile [phone]       Best Time: any    Call taken on 11/30/2017 at 1:05 PM by WOOD JANG

## 2017-12-01 NOTE — TELEPHONE ENCOUNTER
Left detailed voice message informing patient OV for refill. Asked to call clinic for appointment before refill could be approved.

## 2017-12-01 NOTE — TELEPHONE ENCOUNTER
It's been 6 months since last OV - patient is due.  Can you let her know?  Dr. Brigid Guajardo MD/[facility]

## 2017-12-06 ENCOUNTER — OFFICE VISIT (OUTPATIENT)
Dept: FAMILY MEDICINE | Facility: CLINIC | Age: 27
End: 2017-12-06
Payer: COMMERCIAL

## 2017-12-06 VITALS
TEMPERATURE: 97.8 F | DIASTOLIC BLOOD PRESSURE: 84 MMHG | RESPIRATION RATE: 20 BRPM | BODY MASS INDEX: 24.62 KG/M2 | HEART RATE: 89 BPM | HEIGHT: 70 IN | WEIGHT: 172 LBS | OXYGEN SATURATION: 99 % | SYSTOLIC BLOOD PRESSURE: 139 MMHG

## 2017-12-06 DIAGNOSIS — F98.8 ATTENTION DEFICIT DISORDER (ADD) WITHOUT HYPERACTIVITY: Primary | ICD-10-CM

## 2017-12-06 DIAGNOSIS — Z23 NEED FOR PROPHYLACTIC VACCINATION AND INOCULATION AGAINST INFLUENZA: ICD-10-CM

## 2017-12-06 PROCEDURE — 90471 IMMUNIZATION ADMIN: CPT | Performed by: FAMILY MEDICINE

## 2017-12-06 PROCEDURE — 90686 IIV4 VACC NO PRSV 0.5 ML IM: CPT | Performed by: FAMILY MEDICINE

## 2017-12-06 PROCEDURE — 99213 OFFICE O/P EST LOW 20 MIN: CPT | Mod: 25 | Performed by: FAMILY MEDICINE

## 2017-12-06 RX ORDER — METHYLPHENIDATE HYDROCHLORIDE 20 MG/1
20 TABLET ORAL 3 TIMES DAILY PRN
Qty: 90 TABLET | Refills: 0 | Status: SHIPPED | OUTPATIENT
Start: 2018-02-06 | End: 2018-05-22

## 2017-12-06 RX ORDER — METHYLPHENIDATE HYDROCHLORIDE 20 MG/1
20 TABLET ORAL 3 TIMES DAILY PRN
Qty: 90 TABLET | Refills: 0 | Status: SHIPPED | OUTPATIENT
Start: 2018-01-06 | End: 2017-12-06

## 2017-12-06 RX ORDER — METHYLPHENIDATE HYDROCHLORIDE 20 MG/1
20 TABLET ORAL 3 TIMES DAILY PRN
Qty: 90 TABLET | Refills: 0 | Status: SHIPPED | OUTPATIENT
Start: 2017-12-06 | End: 2017-12-06

## 2017-12-06 NOTE — NURSING NOTE
"Chief Complaint   Patient presents with     Recheck Medication     /84  Pulse 89  Temp 97.8  F (36.6  C) (Oral)  Resp 20  Ht 5' 10\" (1.778 m)  Wt 172 lb (78 kg)  LMP  (LMP Unknown)  SpO2 99%  Breastfeeding? No  BMI 24.68 kg/m2 Estimated body mass index is 24.68 kg/(m^2) as calculated from the following:    Height as of this encounter: 5' 10\" (1.778 m).    Weight as of this encounter: 172 lb (78 kg).  BP completed using cuff size: regular   Angelita Conklin CMA    Health Maintenance Due   Topic Date Due     INFLUENZA VACCINE (SYSTEM ASSIGNED)  09/01/2017     Health Maintenance reviewed at today's visit patient asked to schedule/complete:   Immunizations:  Patient agrees to schedule    "

## 2017-12-06 NOTE — MR AVS SNAPSHOT
"              After Visit Summary   2017    Odalis Garcia    MRN: [id number]           Patient Information     Date Of Birth          1990        Visit Information        Provider Department      2017 4:00 PM Brigid Guajardo MD [facility]        Today's Diagnoses     Attention deficit disorder (ADD) without hyperactivity    -  1    Need for prophylactic vaccination and inoculation against influenza           Follow-ups after your visit        Who to contact     If you have questions or need follow up information about today's clinic visit or your schedule please contact [facility] directly at [phone].  Normal or non-critical lab and imaging results will be communicated to you by Structure Visionhart, letter or phone within 4 business days after the clinic has received the results. If you do not hear from us within 7 days, please contact the clinic through Structure Visionhart or phone. If you have a critical or abnormal lab result, we will notify you by phone as soon as possible.  Submit refill requests through [employer] or call your pharmacy and they will forward the refill request to us. Please allow 3 business days for your refill to be completed.          Additional Information About Your Visit        MyChart Information     [employer] [facility] lets you send messages to your doctor, view your test results, renew your prescriptions, schedule appointments and more. To sign up, go to [email or web address] . Click on \"Log in\" on the left side of the screen, which will take you to the Welcome page. Then click on \"Sign up Now\" on the right side of the page.     You will be asked to enter the access code listed below, as well as some personal information. Please follow the directions to create your username and password.     Your access code is: [email or web address]  Expires: 3/6/2018  5:03 PM     Your access code will  in 90 days. If you need help or a new code, " "please call your [facility] clinic or [phone].        Care EveryWhere ID     This is your Care EveryWhere ID. This could be used by other organizations to access your [facility] medical records  [id number]        Your Vitals Were     Pulse Temperature Respirations Height Last Period Pulse Oximetry    89 97.8  F (36.6  C) (Oral) 20 5' 10\" (1.778 m) (LMP Unknown) 99%    Breastfeeding? BMI (Body Mass Index)                No 24.68 kg/m2           Blood Pressure from Last 3 Encounters:   12/06/17 139/84   05/30/17 112/64   04/28/17 112/68    Weight from Last 3 Encounters:   12/06/17 172 lb (78 kg)   05/30/17 168 lb (76.2 kg)   04/28/17 170 lb (77.1 kg)              We Performed the Following     FLU VAC, SPLIT VIRUS IM > 3 YO (QUADRIVALENT) [41819]     Vaccine Administration, Initial [51864]          Today's Medication Changes          These changes are accurate as of: 12/6/17  5:03 PM.  If you have any questions, ask your nurse or doctor.               Start taking these medicines.        Dose/Directions    methylphenidate 20 MG tablet   Commonly known as:  RITALIN   Used for:  Attention deficit disorder (ADD) without hyperactivity   Started by:  Brigid Guajardo MD        Dose:  20 mg   Start taking on:  2/6/2018   Take 1 tablet (20 mg) by mouth 3 times daily as needed   Quantity:  90 tablet   Refills:  0            Where to get your medicines      Some of these will need a paper prescription and others can be bought over the counter.  Ask your nurse if you have questions.     Bring a paper prescription for each of these medications     methylphenidate 20 MG tablet                Primary Care Provider Office Phone # Fax #    Brigid Guajardo [phone] [address] [facility] [address]        Equal Access to Services     DIANN PINA AH: Paige Dudley, keon gallagher, álvaro leonardo. So wa " [phone].    ATENCIÓN: Si radha dorsey, tiene a desai disposición servicios gratuitos de asistencia lingüística. Maryan dela cruz [phone].    We comply with applicable federal civil rights laws and [place] laws. We do not discriminate on the basis of race, color, national origin, age, disability, sex, sexual orientation, or gender identity.            Thank you!     Thank you for choosing [facility]  for your care. Our goal is always to provide you with excellent care. Hearing back from our patients is one way we can continue to improve our services. Please take a few minutes to complete the written survey that you may receive in the mail after your visit with us. Thank you!             Your Updated Medication List - Protect others around you: Learn how to safely use, store and throw away your medicines at www.disposemymeds.org.          This list is accurate as of: 12/6/17  5:03 PM.  Always use your most recent med list.                   Brand Name Dispense Instructions for use Diagnosis    fluticasone 50 MCG/ACT spray    FLONASE    1 Bottle    Spray 1-2 sprays into both nostrils daily    Seasonal allergic rhinitis       methylphenidate 20 MG tablet   Start taking on:  2/6/2018    RITALIN    90 tablet    Take 1 tablet (20 mg) by mouth 3 times daily as needed    Attention deficit disorder (ADD) without hyperactivity

## 2017-12-06 NOTE — PROGRESS NOTES
"  SUBJECTIVE:   Odalis Garcia is a 27 year old female who presents to clinic today for the following health issues:    Medication Followup of Ritalin      Taking Medication as prescribed: yes    Side Effects:  None    Medication Helping Symptoms:  yes     Going really well!  No concerns.  Just here for routine follow up.    No side effects.  No issues.  Feels like med is still working, and is still necessary.    See previous notes for details; longstanding issue.  Has been formally diagnosed.    Problem list and histories reviewed & adjusted, as indicated.  Additional history: as documented    BP Readings from Last 3 Encounters:   12/06/17 139/84   05/30/17 112/64   04/28/17 112/68    Wt Readings from Last 3 Encounters:   12/06/17 172 lb (78 kg)   05/30/17 168 lb (76.2 kg)   04/28/17 170 lb (77.1 kg)          Reviewed and updated as needed this visit by clinical staffTobacco  Allergies  Meds  Problems  Med Hx  Surg Hx  Fam Hx  Soc Hx        Reviewed and updated as needed this visit by Provider  Problems         ROS:  Constitutional, HEENT, cardiovascular, pulmonary, gi and gu systems are negative, except as otherwise noted.      OBJECTIVE:   /84  Pulse 89  Temp 97.8  F (36.6  C) (Oral)  Resp 20  Ht 5' 10\" (1.778 m)  Wt 172 lb (78 kg)  LMP  (LMP Unknown)  SpO2 99%  Breastfeeding? No  BMI 24.68 kg/m2  Body mass index is 24.68 kg/(m^2).  GENERAL: healthy, alert and no distress  MENTAL STATUS EXAM  Appearance: appropriate  Attitude: cooperative  Behavior: normal  Eyre Contact: normal  Speech: normal  Orientation: oriented to person , place, time and situation  Mood:  \"good!\"  Affect: Mood Congruient  Thought Process: clear  Suicidal Ideation: reports no thoughts, no intention  Hallucination: no    ASSESSMENT/PLAN:     Odalis was seen today for recheck medication.    Diagnoses and all orders for this visit:    Attention deficit disorder (ADD) without hyperactivity  -     Discontinue: methylphenidate " (RITALIN) 20 MG tablet; Take 1 tablet (20 mg) by mouth 3 times daily as needed  -     Discontinue: methylphenidate (RITALIN) 20 MG tablet; Take 1 tablet (20 mg) by mouth 3 times daily as needed  -     methylphenidate (RITALIN) 20 MG tablet; Take 1 tablet (20 mg) by mouth 3 times daily as needed    Need for prophylactic vaccination and inoculation against influenza  -     Cancel: C FLU VAC QUADRIVALENT SPLIT VIRUS 3+YRS IM  -     FLU VAC, SPLIT VIRUS IM > 3 YO (QUADRIVALENT) [43916]  -     Vaccine Administration, Initial [44729]      Stable on ADHD med; on contract, checked  and no issues.  Refilled x 3 months.  Follow up with call for next refill, then for complete physical exam for following refill.    Brigid Guajardo MD  [facility]    Injectable Influenza Immunization Documentation    1.  Is the person to be vaccinated sick today?   No    2. Does the person to be vaccinated have an allergy to a component   of the vaccine?   No  Egg Allergy Algorithm Link    3. Has the person to be vaccinated ever had a serious reaction   to influenza vaccine in the past?   No    4. Has the person to be vaccinated ever had Guillain-Barré syndrome?   No    Form completed by Angelita Conklin CMA

## 2017-12-09 PROBLEM — F43.22 ADJUSTMENT DISORDER WITH ANXIOUS MOOD: Status: RESOLVED | Noted: 2017-06-10 | Resolved: 2017-12-09

## 2018-05-08 ENCOUNTER — TELEPHONE (OUTPATIENT)
Dept: FAMILY MEDICINE | Facility: CLINIC | Age: 28
End: 2018-05-08

## 2018-05-08 DIAGNOSIS — F98.8 ATTENTION DEFICIT DISORDER (ADD) WITHOUT HYPERACTIVITY: ICD-10-CM

## 2018-05-08 RX ORDER — METHYLPHENIDATE HYDROCHLORIDE 20 MG/1
20 TABLET ORAL 3 TIMES DAILY PRN
Qty: 90 TABLET | Refills: 0 | Status: CANCELLED | OUTPATIENT
Start: 2018-05-08

## 2018-05-08 NOTE — TELEPHONE ENCOUNTER
Ritalin 20 mg      Last Written Prescription Date:  2/6/18  Last Fill Quantity: 90,   # refills: 0  Last Office Visit: Dr Brigid Guajardo  Future Office visit:       Routing refill request to provider for review/approval because:  Drug not on the [facility], P or [facility] refill protocol or controlled substance

## 2018-05-08 NOTE — TELEPHONE ENCOUNTER
Reason for Call:  Medication or medication refill:    Do you use a [facility]?  Name of the pharmacy and phone number for the current request:  Will     Name of the medication requested: Ritalin 20 mg    Other request: Please call when ready.  [place]. Patient is requesting 3 months     Can we leave a detailed message on this number? YES    Phone number patient can be reached at: Home number on file [phone] (home)    Best Time: any    Call taken on 5/8/2018 at 7:40 AM by ELENITA COOK

## 2018-05-09 RX ORDER — METHYLPHENIDATE HYDROCHLORIDE 20 MG/1
20 TABLET ORAL 2 TIMES DAILY
Qty: 60 TABLET | Refills: 0 | Status: SHIPPED | OUTPATIENT
Start: 2018-06-07 | End: 2018-05-22

## 2018-05-09 RX ORDER — METHYLPHENIDATE HYDROCHLORIDE 20 MG/1
20 TABLET ORAL 2 TIMES DAILY
Qty: 60 TABLET | Refills: 0 | Status: SHIPPED | OUTPATIENT
Start: 2018-07-07 | End: 2018-05-22

## 2018-05-09 RX ORDER — METHYLPHENIDATE HYDROCHLORIDE 20 MG/1
20 TABLET ORAL 2 TIMES DAILY
Qty: 60 TABLET | Refills: 0 | Status: SHIPPED | OUTPATIENT
Start: 2018-05-09 | End: 2018-05-22

## 2018-05-09 NOTE — TELEPHONE ENCOUNTER
Patient is calling to check the status of her medication request and would like to make sure it's 3 prescriptions.

## 2018-05-21 ENCOUNTER — TELEPHONE (OUTPATIENT)
Dept: FAMILY MEDICINE | Facility: CLINIC | Age: 28
End: 2018-05-21

## 2018-05-21 DIAGNOSIS — F98.8 ATTENTION DEFICIT DISORDER (ADD) WITHOUT HYPERACTIVITY: ICD-10-CM

## 2018-05-21 RX ORDER — METHYLPHENIDATE HYDROCHLORIDE 20 MG/1
20 TABLET ORAL 2 TIMES DAILY
Qty: 60 TABLET | Refills: 0 | Status: CANCELLED | OUTPATIENT
Start: 2018-05-21

## 2018-05-21 NOTE — TELEPHONE ENCOUNTER
"Pt's mother Deepa, called requesting dose correction on script for Ritalin. Sig reads pt is to take BID and mother reports pt typically takes TID. Mother states pt has been out of medication for 5 days. Explained to mother PCP is out of the office today. Message will be sent to other clinic providers for approval. Writer cannot disclose any more details on sig due to HIPPA. Their is no consent to communicate on file. Please review and advice. Mother can be called when rx is ready for  she is helping pt. FYI pt may need updated future scripts 06/2018 and 07/2018.    Patient is followed by JET DOZIER for ongoing prescription of stimulants.  All refills should be approved by this provider, or covering partner.    Medication(s): Ritalin.   Maximum quantity per month: #90  Clinic visit frequency required: Q 6  months     Controlled substance agreement on file: Yes       Date(s): 7/29/16  Neuropsych evaluation for ADD completed:  Yes, completed at age 5 and we have records from [facility], not on file    Last [facility] website verification:  done on 12/9/2017- no concerns    Pt's mother said if something happens to pt \"I will make your clinic responsible\".  "

## 2018-05-21 NOTE — TELEPHONE ENCOUNTER
The patient called back and said she has less than a day left.   The patient was informed that this will be addressed when the PCP is back in office tomorrow.

## 2018-05-22 RX ORDER — METHYLPHENIDATE HYDROCHLORIDE 20 MG/1
20 TABLET ORAL 3 TIMES DAILY PRN
Qty: 90 TABLET | Refills: 0 | Status: SHIPPED | OUTPATIENT
Start: 2018-05-22 | End: 2018-05-22

## 2018-05-22 RX ORDER — METHYLPHENIDATE HYDROCHLORIDE 20 MG/1
20 TABLET ORAL 3 TIMES DAILY PRN
Qty: 90 TABLET | Refills: 0 | Status: SHIPPED | OUTPATIENT
Start: 2018-06-21 | End: 2018-05-22

## 2018-05-22 RX ORDER — METHYLPHENIDATE HYDROCHLORIDE 20 MG/1
20 TABLET ORAL 3 TIMES DAILY PRN
Qty: 90 TABLET | Refills: 0 | Status: SHIPPED | OUTPATIENT
Start: 2018-07-21 | End: 2018-08-21

## 2018-05-22 NOTE — TELEPHONE ENCOUNTER
"Fixed scripts; was error on my part as there is a \"one-click button\" to order for BID dosing and I have to hand enter for TID.    Called and apologized to patient; they are ready at  and she'll turn in old scripts.  Dr. Brigid Guajardo MD/[facility]    "

## 2018-08-21 ENCOUNTER — OFFICE VISIT (OUTPATIENT)
Dept: FAMILY MEDICINE | Facility: CLINIC | Age: 28
End: 2018-08-21
Payer: COMMERCIAL

## 2018-08-21 VITALS
BODY MASS INDEX: 23.7 KG/M2 | TEMPERATURE: 98.9 F | HEIGHT: 69 IN | SYSTOLIC BLOOD PRESSURE: 112 MMHG | OXYGEN SATURATION: 100 % | WEIGHT: 160 LBS | HEART RATE: 82 BPM | DIASTOLIC BLOOD PRESSURE: 66 MMHG

## 2018-08-21 DIAGNOSIS — F98.8 ATTENTION DEFICIT DISORDER (ADD) WITHOUT HYPERACTIVITY: ICD-10-CM

## 2018-08-21 DIAGNOSIS — T83.9XXA COMPLICATION OF INTRAUTERINE DEVICE (IUD), UNSPECIFIED COMPLICATION, INITIAL ENCOUNTER (H): ICD-10-CM

## 2018-08-21 DIAGNOSIS — Z00.00 ROUTINE GENERAL MEDICAL EXAMINATION AT A HEALTH CARE FACILITY: Primary | ICD-10-CM

## 2018-08-21 PROCEDURE — 99395 PREV VISIT EST AGE 18-39: CPT | Performed by: FAMILY MEDICINE

## 2018-08-21 RX ORDER — METHYLPHENIDATE HYDROCHLORIDE 20 MG/1
20 TABLET ORAL 3 TIMES DAILY PRN
Qty: 90 TABLET | Refills: 0 | Status: CANCELLED | OUTPATIENT
Start: 2018-08-21

## 2018-08-21 RX ORDER — METHYLPHENIDATE HYDROCHLORIDE 20 MG/1
20 TABLET ORAL DAILY
Qty: 90 TABLET | Refills: 0 | Status: SHIPPED | OUTPATIENT
Start: 2018-10-22 | End: 2018-11-02

## 2018-08-21 RX ORDER — METHYLPHENIDATE HYDROCHLORIDE 20 MG/1
20 TABLET ORAL DAILY
Qty: 90 TABLET | Refills: 0 | Status: SHIPPED | OUTPATIENT
Start: 2018-09-21 | End: 2018-10-21

## 2018-08-21 RX ORDER — METHYLPHENIDATE HYDROCHLORIDE 20 MG/1
20 TABLET ORAL 3 TIMES DAILY
Qty: 90 TABLET | Refills: 0 | Status: SHIPPED | OUTPATIENT
Start: 2018-08-21 | End: 2018-09-20

## 2018-08-21 NOTE — PROGRESS NOTES
SUBJECTIVE:   CC: Odalis Garcia is an 28 year old woman who presents for preventive health visit.     Physical   Annual:     Getting at least 3 servings of Calcium per day:  Yes    Bi-annual eye exam:  Yes    Dental care twice a year:  Yes    Sleep apnea or symptoms of sleep apnea:  None    Diet:  Regular (no restrictions)    Frequency of exercise:  1 day/week    Duration of exercise:  30-45 minutes    Taking medications regularly:  Yes    Medication side effects:  None    Additional concerns today:  No          Today's PHQ-2 Score:   PHQ-2 ( 1999 Pfizer) 8/21/2018   Q1: Little interest or pleasure in doing things 0   Q2: Feeling down, depressed or hopeless 0   PHQ-2 Score 0   Q1: Little interest or pleasure in doing things Not at all   Q2: Feeling down, depressed or hopeless Not at all   PHQ-2 Score 0       Abuse: Current or Past(Physical, Sexual or Emotional)- No  Do you feel safe in your environment - Yes    Social History   Substance Use Topics     Smoking status: Never Smoker     Smokeless tobacco: Never Used     Alcohol use 0.0 oz/week     0 Standard drinks or equivalent per week     Alcohol Use 8/21/2018   If you drink alcohol do you typically have greater than 3 drinks per day OR greater than 7 drinks per week? No   No flowsheet data found.    Reviewed orders with patient.  Reviewed health maintenance and updated orders accordingly - Yes    Mammogram not appropriate for this patient based on age.    Pertinent mammograms are reviewed under the imaging tab.  History of abnormal Pap smear: NO - age 21-29 PAP every 3 years recommended  PAP / HPV 7/29/2016   PAP NIL     Reviewed and updated as needed this visit by clinical staff  Tobacco  Allergies  Meds  Problems  Med Hx  Surg Hx  Fam Hx  Soc Hx          Reviewed and updated as needed this visit by Provider  Problems            Review of Systems  CONSTITUTIONAL: NEGATIVE for fever, chills, change in weight  INTEGUMENTARU/SKIN: NEGATIVE for worrisome  "rashes, moles or lesions  EYES: NEGATIVE for vision changes or irritation  ENT: NEGATIVE for ear, mouth and throat problems  RESP: NEGATIVE for significant cough or SOB  BREAST: NEGATIVE for masses, tenderness or discharge  CV: NEGATIVE for chest pain, palpitations or peripheral edema  GI: NEGATIVE for nausea, abdominal pain, heartburn, or change in bowel habits  : NEGATIVE for unusual urinary or vaginal symptoms. Periods are regular.  MUSCULOSKELETAL: NEGATIVE for significant arthralgias or myalgia  NEURO: NEGATIVE for weakness, dizziness or paresthesias  PSYCHIATRIC: NEGATIVE for changes in mood or affect     OBJECTIVE:   /66 (Cuff Size: Adult Large)  Pulse 82  Temp 98.9  F (37.2  C) (Tympanic)  Ht 5' 9.25\" (1.759 m)  Wt 160 lb (72.6 kg)  SpO2 100%  BMI 23.46 kg/m2  Physical Exam  GENERAL: healthy, alert and no distress  EYES: Eyes grossly normal to inspection, PERRL and conjunctivae and sclerae normal  HENT: ear canals and TM's normal, nose and mouth without ulcers or lesions  NECK: no adenopathy, no asymmetry, masses, or scars and thyroid normal to palpation  RESP: lungs clear to auscultation - no rales, rhonchi or wheezes  CV: regular rate and rhythm, normal S1 S2, no S3 or S4, no murmur, click or rub, no peripheral edema and peripheral pulses strong  ABDOMEN: soft, nontender, no hepatosplenomegaly, no masses and bowel sounds normal  MS: no gross musculoskeletal defects noted, no edema  SKIN: no suspicious lesions or rashes  NEURO: Normal strength and tone, mentation intact and speech normal  PSYCH: mentation appears normal, affect normal/bright        ASSESSMENT/PLAN:   Odalis was seen today for physical.    Diagnoses and all orders for this visit:    Routine general medical examination at a health care facility    Attention deficit disorder (ADD) without hyperactivity  Comments:  Stable, on a contract and has been for years.  BP and heart rate normal.  Will establish care with new doc in " "[place].  Orders:  -     methylphenidate (RITALIN) 20 MG tablet; Take 1 tablet (20 mg) by mouth 3 times daily  -     methylphenidate (RITALIN) 20 MG tablet; Take 1 tablet (20 mg) by mouth daily  -     methylphenidate (RITALIN) 20 MG tablet; Take 1 tablet (20 mg) by mouth daily    Complication of intrauterine device (IUD), unspecified complication, initial encounter (H)  Comments:  Strings not visible - periods the same (copper IUD, been very heavy since placed).  Will get US to confirm placement.  Orders:  -     US Pelvic Complete w Transvaginal; Future    Other orders  -     Cancel: methylphenidate (RITALIN) 20 MG tablet; Take 1 tablet (20 mg) by mouth 3 times daily as needed        COUNSELING:  Reviewed preventive health counseling, as reflected in patient instructions       Regular exercise       Healthy diet/nutrition       Contraception    BP Readings from Last 1 Encounters:   08/21/18 112/66     Estimated body mass index is 23.46 kg/(m^2) as calculated from the following:    Height as of this encounter: 5' 9.25\" (1.759 m).    Weight as of this encounter: 160 lb (72.6 kg).           reports that she has never smoked. She has never used smokeless tobacco.      Counseling Resources:  ATP IV Guidelines  Pooled Cohorts Equation Calculator  Breast Cancer Risk Calculator  FRAX Risk Assessment  ICSI Preventive Guidelines  Dietary Guidelines for Americans, 2010  USDA's MyPlate  ASA Prophylaxis  Lung CA Screening    Brigid Guajardo MD  [facility]  Answers for HPI/ROS submitted by the patient on 8/21/2018   PHQ-2 Score: 0    "

## 2018-08-21 NOTE — PATIENT INSTRUCTIONS
You can have your US done - Nicole call [phone].        Preventive Health Recommendations  Female Ages 26 - 39  Yearly exam:   See your health care provider every year in order to    Review health changes.     Discuss preventive care.      Review your medicines if you your doctor has prescribed any.    Until age 30: Get a Pap test every three years (more often if you have had an abnormal result).    After age 30: Talk to your doctor about whether you should have a Pap test every 3 years or have a Pap test with HPV screening every 5 years.   You do not need a Pap test if your uterus was removed (hysterectomy) and you have not had cancer.  You should be tested each year for STDs (sexually transmitted diseases), if you're at risk.   Talk to your provider about how often to have your cholesterol checked.  If you are at risk for diabetes, you should have a diabetes test (fasting glucose).  Shots: Get a flu shot each year. Get a tetanus shot every 10 years.   Nutrition:     Eat at least 5 servings of fruits and vegetables each day.    Eat whole-grain bread, whole-wheat pasta and brown rice instead of white grains and rice.    Get adequate Calcium and Vitamin D.     Lifestyle    Exercise at least 150 minutes a week (30 minutes a day, 5 days of the week). This will help you control your weight and prevent disease.    Limit alcohol to one drink per day.    No smoking.     Wear sunscreen to prevent skin cancer.    See your dentist every six months for an exam and cleaning.

## 2018-08-21 NOTE — MR AVS SNAPSHOT
After Visit Summary   8/21/2018    Odalis Garcia    MRN: [id number]           Patient Information     Date Of Birth          1990        Visit Information        Provider Department      8/21/2018 3:40 PM Brigid Guajardo MD [facility]        Today's Diagnoses     Routine general medical examination at a health care facility    -  1    Attention deficit disorder (ADD) without hyperactivity        Complication of intrauterine device (IUD), unspecified complication, initial encounter (H)          Care Instructions    You can have your US done - [place] call [phone].        Preventive Health Recommendations  Female Ages 26 - 39  Yearly exam:   See your health care provider every year in order to    Review health changes.     Discuss preventive care.      Review your medicines if you your doctor has prescribed any.    Until age 30: Get a Pap test every three years (more often if you have had an abnormal result).    After age 30: Talk to your doctor about whether you should have a Pap test every 3 years or have a Pap test with HPV screening every 5 years.   You do not need a Pap test if your uterus was removed (hysterectomy) and you have not had cancer.  You should be tested each year for STDs (sexually transmitted diseases), if you're at risk.   Talk to your provider about how often to have your cholesterol checked.  If you are at risk for diabetes, you should have a diabetes test (fasting glucose).  Shots: Get a flu shot each year. Get a tetanus shot every 10 years.   Nutrition:     Eat at least 5 servings of fruits and vegetables each day.    Eat whole-grain bread, whole-wheat pasta and brown rice instead of white grains and rice.    Get adequate Calcium and Vitamin D.     Lifestyle    Exercise at least 150 minutes a week (30 minutes a day, 5 days of the week). This will help you control your weight and prevent disease.    Limit alcohol to one drink per  "day.    No smoking.     Wear sunscreen to prevent skin cancer.    See your dentist every six months for an exam and cleaning.            Follow-ups after your visit        Future tests that were ordered for you today     Open Future Orders        Priority Expected Expires Ordered    US Pelvic Complete w Transvaginal Routine  8/21/2019 8/21/2018            Who to contact     If you have questions or need follow up information about today's clinic visit or your schedule please contact [facility] directly at [phone].  Normal or non-critical lab and imaging results will be communicated to you by MyChart, letter or phone within 4 business days after the clinic has received the results. If you do not hear from us within 7 days, please contact the clinic through MyChart or phone. If you have a critical or abnormal lab result, we will notify you by phone as soon as possible.  Submit refill requests through [employer] or call your pharmacy and they will forward the refill request to us. Please allow 3 business days for your refill to be completed.          Additional Information About Your Visit        Care EveryWhere ID     This is your Care EveryWhere ID. This could be used by other organizations to access your [facility] medical records  [id number]        Your Vitals Were     Pulse Temperature Height Pulse Oximetry BMI (Body Mass Index)       82 98.9  F (37.2  C) (Tympanic) 5' 9.25\" (1.759 m) 100% 23.46 kg/m2        Blood Pressure from Last 3 Encounters:   08/21/18 112/66   12/06/17 139/84   05/30/17 112/64    Weight from Last 3 Encounters:   08/21/18 160 lb (72.6 kg)   12/06/17 172 lb (78 kg)   05/30/17 168 lb (76.2 kg)                 Today's Medication Changes          These changes are accurate as of 8/21/18  4:09 PM.  If you have any questions, ask your nurse or doctor.               These medicines have changed or have updated prescriptions.        Dose/Directions    * " methylphenidate 20 MG tablet   Commonly known as:  RITALIN   This may have changed:    - when to take this  - reasons to take this   Used for:  Attention deficit disorder (ADD) without hyperactivity   Changed by:  Brigid Guajardo MD        Dose:  20 mg   Take 1 tablet (20 mg) by mouth 3 times daily   Quantity:  90 tablet   Refills:  0       * methylphenidate 20 MG tablet   Commonly known as:  RITALIN   This may have changed:  You were already taking a medication with the same name, and this prescription was added. Make sure you understand how and when to take each.   Used for:  Attention deficit disorder (ADD) without hyperactivity   Changed by:  Brigid Guajardo MD        Dose:  20 mg   Start taking on:  9/21/2018   Take 1 tablet (20 mg) by mouth daily   Quantity:  90 tablet   Refills:  0       * methylphenidate 20 MG tablet   Commonly known as:  RITALIN   This may have changed:  You were already taking a medication with the same name, and this prescription was added. Make sure you understand how and when to take each.   Used for:  Attention deficit disorder (ADD) without hyperactivity   Changed by:  Brigid Guajardo MD        Dose:  20 mg   Start taking on:  10/22/2018   Take 1 tablet (20 mg) by mouth daily   Quantity:  90 tablet   Refills:  0       * Notice:  This list has 3 medication(s) that are the same as other medications prescribed for you. Read the directions carefully, and ask your doctor or other care provider to review them with you.         Where to get your medicines      Some of these will need a paper prescription and others can be bought over the counter.  Ask your nurse if you have questions.     Bring a paper prescription for each of these medications     methylphenidate 20 MG tablet    methylphenidate 20 MG tablet    methylphenidate 20 MG tablet                Primary Care Provider Office Phone # Fax #    Brigid Guajardo [phone]       [address] [place] [address]  [place] [id number]        Equal Access to Services     [facility] SILVANA : Hadii latisha lai terencekerline Rebeccaali, waaxda luqadaha, qaybta kaalmaирина stroud, álvaro tobias. So [facility] [phone].    ATENCIÓN: Si habla español, tiene a desai disposición servicios gratuitos de asistencia lingüística. Teame al [phone].    We comply with applicable federal civil rights laws and [place] laws. We do not discriminate on the basis of race, color, national origin, age, disability, sex, sexual orientation, or gender identity.            Thank you!     Thank you for choosing [facility]  for your care. Our goal is always to provide you with excellent care. Hearing back from our patients is one way we can continue to improve our services. Please take a few minutes to complete the written survey that you may receive in the mail after your visit with us. Thank you!             Your Updated Medication List - Protect others around you: Learn how to safely use, store and throw away your medicines at www.disposemymeds.org.          This list is accurate as of 8/21/18  4:09 PM.  Always use your most recent med list.                   Brand Name Dispense Instructions for use Diagnosis    fluticasone 50 MCG/ACT spray    FLONASE    1 Bottle    Spray 1-2 sprays into both nostrils daily    Seasonal allergic rhinitis       * methylphenidate 20 MG tablet    RITALIN    90 tablet    Take 1 tablet (20 mg) by mouth 3 times daily    Attention deficit disorder (ADD) without hyperactivity       * methylphenidate 20 MG tablet   Start taking on:  9/21/2018    RITALIN    90 tablet    Take 1 tablet (20 mg) by mouth daily    Attention deficit disorder (ADD) without hyperactivity       * methylphenidate 20 MG tablet   Start taking on:  10/22/2018    RITALIN    90 tablet    Take 1 tablet (20 mg) by mouth daily    Attention deficit disorder (ADD) without hyperactivity       * Notice:  This list has 3 medication(s)  that are the same as other medications prescribed for you. Read the directions carefully, and ask your doctor or other care provider to review them with you.

## 2018-11-01 ENCOUNTER — TELEPHONE (OUTPATIENT)
Dept: FAMILY MEDICINE | Facility: CLINIC | Age: 28
End: 2018-11-01

## 2018-11-01 DIAGNOSIS — F98.8 ATTENTION DEFICIT DISORDER (ADD) WITHOUT HYPERACTIVITY: ICD-10-CM

## 2018-11-01 NOTE — TELEPHONE ENCOUNTER
Reason for Call:  Other prescription    Detailed comments: Patient moved to [place], [place] and wondering if you can write her another prescription or two for her Ritalin until she can establish care with a new PCP. She could not identify the new pharmacy she's looking to fill at. Would you please give patient a call to discuss if this can be done and to verify her new pharmacy? Thank you.    Phone Number Patient can be reached at: Home number on file [phone] (home)    Best Time: Anytime    Can we leave a detailed message on this number? YES    Call taken on 11/1/2018 at 5:42 PM by Tarik Kapoor

## 2018-11-02 RX ORDER — METHYLPHENIDATE HYDROCHLORIDE 20 MG/1
20 TABLET ORAL DAILY
Qty: 90 TABLET | Refills: 0 | Status: SHIPPED | OUTPATIENT
Start: 2018-11-20

## 2018-11-02 NOTE — TELEPHONE ENCOUNTER
I can send one more prescription.  Will have to be mailed to patient - I'll give to team here to mail today.  But then that's it, she really needs to find a family doc in [place] to take over prescription.  Can you let her know?  Dr. Brigid Guajardo MD/[facility]

## 2021-01-01 NOTE — NURSING NOTE
"Chief Complaint   Patient presents with     Recheck Medication     /88  Pulse 90  Temp 97.8  F (36.6  C) (Oral)  Resp 18  Ht 5' 10\" (1.778 m)  Wt 168 lb (76.2 kg)  LMP  (LMP Unknown)  SpO2 99%  Breastfeeding? No  BMI 24.11 kg/m2 Estimated body mass index is 24.11 kg/(m^2) as calculated from the following:    Height as of this encounter: 5' 10\" (1.778 m).    Weight as of this encounter: 168 lb (76.2 kg).  BP completed using cuff size: regular   Angelita Conklin CMA    There are no preventive care reminders to display for this patient.  Health Maintenance reviewed at today's visit patient asked to schedule/complete:   None, Health Maintenance up to date.    " 2021 14:51